# Patient Record
Sex: FEMALE | Race: WHITE | NOT HISPANIC OR LATINO | URBAN - METROPOLITAN AREA
[De-identification: names, ages, dates, MRNs, and addresses within clinical notes are randomized per-mention and may not be internally consistent; named-entity substitution may affect disease eponyms.]

---

## 2019-03-06 NOTE — PATIENT DISCUSSION
(I50.4850) Primary open-angle glaucoma bilateral indeterminate stage - Assesment : Examination revealed Primary Open Angle Glaucoma. Baseline (here) OCT ONH ordered and performed to evaluate NFL and create a baseline to follow: thinning OU. Gonio performed today. h/o SLT OD with improvement. Pt currently taking Lumigan OU qhs and Alphagan P OS tid. Pt does not like taking Alphagan and would like to proceed with SLT to try and get off Alphagan if possible. Pt follows with  in [place] and next appt there is in April. - Plan : Monitor for IOP and NFL changes with visits, OCTs, HVFs. Discussed condition and option of SLT OS to potentially get off Alphagan as Pt discussed with  in [place]. Recommended Pt to proceed with  in [place], as next SLT appt here is 4/08 and Pt would be unable to follow here for PO care as needed. Continue Lumigan OU qhs and discussed option of decreasing Alphagan P OS to bid if Pt desires for convenience until proceeds with SLT in [place]. Continue following with  in [place] as scheduled. RTC here as recommended by  in [place] while in [facility]. Pt can call to schedule (please schedule OCT ONH if pt calls).

## 2019-03-06 NOTE — PATIENT DISCUSSION
(T60.522) Other secondary cataract, bilateral - Assesment : Posterior capsule opacification present. OD>OS. Pt is bothered and symptomatic. Hx of RD. - Plan : Monitor for changes and increased symptoms. Advised patient of condition and option of Yag Cap to improve visual function. Recommended waiting until more bothered by visual symptoms due to hx of RD and increased risk of RD s/p Yag Cap.

## 2021-10-25 ENCOUNTER — CATARACT EVALUATION (OUTPATIENT)
Dept: URBAN - METROPOLITAN AREA CLINIC 14 | Facility: CLINIC | Age: 66
End: 2021-10-25

## 2021-10-25 DIAGNOSIS — H18.513: ICD-10-CM

## 2021-10-25 DIAGNOSIS — H25.13: ICD-10-CM

## 2021-10-25 PROCEDURE — 92286 ANT SGM IMG I&R SPECLR MIC: CPT

## 2021-10-25 PROCEDURE — 99214 OFFICE O/P EST MOD 30 MIN: CPT

## 2021-10-25 ASSESSMENT — KERATOMETRY
OS_AXISANGLE_DEGREES: 137
OS_K2POWER_DIOPTERS: 42.50
OS_AXISANGLE2_DEGREES: 47
OD_AXISANGLE2_DEGREES: 75
OS_K1POWER_DIOPTERS: 42.25
OD_K2POWER_DIOPTERS: 43.00
OD_AXISANGLE_DEGREES: 165
OD_K1POWER_DIOPTERS: 42.25

## 2021-10-25 ASSESSMENT — TONOMETRY
OS_IOP_MMHG: 12
OD_IOP_MMHG: 13

## 2021-10-28 ENCOUNTER — SURGICAL TESTING (OUTPATIENT)
Dept: URBAN - METROPOLITAN AREA CLINIC 14 | Facility: CLINIC | Age: 66
End: 2021-10-28

## 2021-10-28 DIAGNOSIS — H25.13: ICD-10-CM

## 2021-10-28 DIAGNOSIS — H18.513: ICD-10-CM

## 2021-10-28 PROCEDURE — 99199ADVT ADVANCED VISION TESTING

## 2021-10-28 ASSESSMENT — KERATOMETRY
OD_AXISANGLE2_DEGREES: 75
OD_K2POWER_DIOPTERS: 43.00
OS_AXISANGLE_DEGREES: 137
OS_K2POWER_DIOPTERS: 42.50
OD_AXISANGLE_DEGREES: 165
OD_K1POWER_DIOPTERS: 42.25
OS_K1POWER_DIOPTERS: 42.25
OS_AXISANGLE2_DEGREES: 47

## 2021-11-08 ENCOUNTER — ESTABLISHED PATIENT (OUTPATIENT)
Dept: URBAN - METROPOLITAN AREA CLINIC 14 | Facility: CLINIC | Age: 66
End: 2021-11-08

## 2021-11-08 DIAGNOSIS — H43.813: ICD-10-CM

## 2021-11-08 DIAGNOSIS — H25.13: ICD-10-CM

## 2021-11-08 PROCEDURE — 92014 COMPRE OPH EXAM EST PT 1/>: CPT

## 2021-11-08 PROCEDURE — 92134 CPTRZ OPH DX IMG PST SGM RTA: CPT

## 2021-11-08 ASSESSMENT — KERATOMETRY
OS_AXISANGLE2_DEGREES: 47
OS_K1POWER_DIOPTERS: 42.25
OS_AXISANGLE_DEGREES: 137
OD_K2POWER_DIOPTERS: 43.00
OD_AXISANGLE_DEGREES: 165
OD_K1POWER_DIOPTERS: 42.25
OD_AXISANGLE2_DEGREES: 75
OS_K2POWER_DIOPTERS: 42.50

## 2021-11-08 ASSESSMENT — TONOMETRY
OS_IOP_MMHG: 14
OD_IOP_MMHG: 14

## 2021-11-08 ASSESSMENT — VISUAL ACUITY
OD_CC: 20/40-2
OS_CC: J5

## 2021-11-09 ENCOUNTER — POST-OP (OUTPATIENT)
Dept: URBAN - METROPOLITAN AREA CLINIC 9 | Facility: CLINIC | Age: 66
End: 2021-11-09

## 2021-11-09 DIAGNOSIS — H25.11: ICD-10-CM

## 2021-11-09 DIAGNOSIS — Z96.1: ICD-10-CM

## 2021-11-09 PROCEDURE — 92136 OPHTHALMIC BIOMETRY: CPT

## 2021-11-09 PROCEDURE — 99024 POSTOP FOLLOW-UP VISIT: CPT

## 2021-11-09 ASSESSMENT — VISUAL ACUITY
OD_SC: CF 2FT
OS_SC: 20/50

## 2021-11-09 ASSESSMENT — TONOMETRY
OD_IOP_MMHG: 13
OS_IOP_MMHG: 14

## 2021-11-09 ASSESSMENT — KERATOMETRY
OD_AXISANGLE2_DEGREES: 75
OD_AXISANGLE_DEGREES: 165
OS_K1POWER_DIOPTERS: 42.25
OD_K2POWER_DIOPTERS: 43.00
OS_K2POWER_DIOPTERS: 42.50
OS_AXISANGLE2_DEGREES: 47
OD_K1POWER_DIOPTERS: 42.25
OS_AXISANGLE_DEGREES: 137

## 2021-11-11 ENCOUNTER — 1 DAY POST-OP (OUTPATIENT)
Dept: URBAN - METROPOLITAN AREA CLINIC 14 | Facility: CLINIC | Age: 66
End: 2021-11-11

## 2021-11-11 DIAGNOSIS — Z98.890: ICD-10-CM

## 2021-11-11 PROCEDURE — 99024 POSTOP FOLLOW-UP VISIT: CPT

## 2021-11-11 ASSESSMENT — KERATOMETRY
OD_AXISANGLE_DEGREES: 165
OD_K2POWER_DIOPTERS: 43.00
OD_K1POWER_DIOPTERS: 42.25
OS_K2POWER_DIOPTERS: 42.50
OS_AXISANGLE2_DEGREES: 47
OD_AXISANGLE2_DEGREES: 75
OS_AXISANGLE_DEGREES: 137
OS_K1POWER_DIOPTERS: 42.25

## 2021-11-11 ASSESSMENT — VISUAL ACUITY
OS_SC: 20/30
OD_SC: 20/30

## 2021-11-11 ASSESSMENT — TONOMETRY
OS_IOP_MMHG: 12
OD_IOP_MMHG: 13

## 2021-11-29 ENCOUNTER — 1 DAY POST-OP (OUTPATIENT)
Dept: URBAN - METROPOLITAN AREA CLINIC 14 | Facility: CLINIC | Age: 66
End: 2021-11-29

## 2021-11-29 DIAGNOSIS — H18.513: ICD-10-CM

## 2021-11-29 DIAGNOSIS — Z98.890: ICD-10-CM

## 2021-11-29 PROCEDURE — 99024 POSTOP FOLLOW-UP VISIT: CPT

## 2021-11-29 ASSESSMENT — KERATOMETRY
OS_AXISANGLE_DEGREES: 137
OD_K2POWER_DIOPTERS: 43.00
OD_AXISANGLE2_DEGREES: 75
OS_K1POWER_DIOPTERS: 42.25
OS_AXISANGLE2_DEGREES: 47
OS_K2POWER_DIOPTERS: 42.50
OD_AXISANGLE_DEGREES: 165
OD_K1POWER_DIOPTERS: 42.25

## 2021-11-29 ASSESSMENT — VISUAL ACUITY
OS_SC: 20/40
OD_SC: 20/30

## 2021-12-02 ENCOUNTER — ESTABLISHED PATIENT (OUTPATIENT)
Dept: URBAN - METROPOLITAN AREA CLINIC 14 | Facility: CLINIC | Age: 66
End: 2021-12-02

## 2021-12-02 DIAGNOSIS — Z98.890: ICD-10-CM

## 2021-12-02 DIAGNOSIS — H18.513: ICD-10-CM

## 2021-12-02 PROCEDURE — 99024 POSTOP FOLLOW-UP VISIT: CPT

## 2021-12-02 ASSESSMENT — VISUAL ACUITY
OS_BCVA: 20/20
OS_SC: J1+
OD_SC: J5
OS_SC: 20/70
OD_SC: 20/20
OD_BCVA: 20/20

## 2021-12-02 ASSESSMENT — KERATOMETRY
OS_AXISANGLE_DEGREES: 137
OD_K1POWER_DIOPTERS: 42.25
OS_AXISANGLE2_DEGREES: 47
OS_K2POWER_DIOPTERS: 42.50
OD_AXISANGLE2_DEGREES: 75
OD_K2POWER_DIOPTERS: 43.00
OD_AXISANGLE_DEGREES: 165
OS_K1POWER_DIOPTERS: 42.25

## 2021-12-06 ENCOUNTER — ESTABLISHED PATIENT (OUTPATIENT)
Dept: URBAN - METROPOLITAN AREA CLINIC 14 | Facility: CLINIC | Age: 66
End: 2021-12-06

## 2021-12-06 DIAGNOSIS — Z98.890: ICD-10-CM

## 2021-12-06 DIAGNOSIS — H18.513: ICD-10-CM

## 2021-12-06 PROCEDURE — 99024 POSTOP FOLLOW-UP VISIT: CPT

## 2021-12-06 ASSESSMENT — KERATOMETRY
OD_K2POWER_DIOPTERS: 43.00
OS_AXISANGLE2_DEGREES: 47
OD_AXISANGLE_DEGREES: 165
OD_AXISANGLE2_DEGREES: 75
OS_K2POWER_DIOPTERS: 42.50
OS_AXISANGLE_DEGREES: 137
OS_K1POWER_DIOPTERS: 42.25
OD_K1POWER_DIOPTERS: 42.25

## 2021-12-08 ASSESSMENT — VISUAL ACUITY
OS_SC: 20/70
OS_SC: J1+
OD_SC: 20/20